# Patient Record
Sex: FEMALE | Race: WHITE | Employment: OTHER | URBAN - NONMETROPOLITAN AREA
[De-identification: names, ages, dates, MRNs, and addresses within clinical notes are randomized per-mention and may not be internally consistent; named-entity substitution may affect disease eponyms.]

---

## 2018-06-18 ENCOUNTER — OFFICE VISIT (OUTPATIENT)
Dept: FAMILY MEDICINE CLINIC | Age: 73
End: 2018-06-18
Payer: MEDICARE

## 2018-06-18 VITALS
BODY MASS INDEX: 27.58 KG/M2 | OXYGEN SATURATION: 93 % | WEIGHT: 171.6 LBS | RESPIRATION RATE: 16 BRPM | HEIGHT: 66 IN | DIASTOLIC BLOOD PRESSURE: 68 MMHG | HEART RATE: 69 BPM | SYSTOLIC BLOOD PRESSURE: 112 MMHG

## 2018-06-18 DIAGNOSIS — M54.50 ACUTE BILATERAL LOW BACK PAIN WITHOUT SCIATICA: ICD-10-CM

## 2018-06-18 DIAGNOSIS — Z91.81 AT HIGH RISK FOR FALLS: Primary | ICD-10-CM

## 2018-06-18 DIAGNOSIS — L72.3 SEBACEOUS CYST: ICD-10-CM

## 2018-06-18 DIAGNOSIS — R20.8 BURNING SENSATION OF TOE AND FOOT: ICD-10-CM

## 2018-06-18 LAB — HBA1C MFR BLD: 5.6 %

## 2018-06-18 PROCEDURE — G8419 CALC BMI OUT NRM PARAM NOF/U: HCPCS | Performed by: FAMILY MEDICINE

## 2018-06-18 PROCEDURE — 1123F ACP DISCUSS/DSCN MKR DOCD: CPT | Performed by: FAMILY MEDICINE

## 2018-06-18 PROCEDURE — 99204 OFFICE O/P NEW MOD 45 MIN: CPT | Performed by: FAMILY MEDICINE

## 2018-06-18 PROCEDURE — 3017F COLORECTAL CA SCREEN DOC REV: CPT | Performed by: FAMILY MEDICINE

## 2018-06-18 PROCEDURE — 1036F TOBACCO NON-USER: CPT | Performed by: FAMILY MEDICINE

## 2018-06-18 PROCEDURE — 83036 HEMOGLOBIN GLYCOSYLATED A1C: CPT | Performed by: FAMILY MEDICINE

## 2018-06-18 PROCEDURE — 4040F PNEUMOC VAC/ADMIN/RCVD: CPT | Performed by: FAMILY MEDICINE

## 2018-06-18 PROCEDURE — G8427 DOCREV CUR MEDS BY ELIG CLIN: HCPCS | Performed by: FAMILY MEDICINE

## 2018-06-18 PROCEDURE — G8400 PT W/DXA NO RESULTS DOC: HCPCS | Performed by: FAMILY MEDICINE

## 2018-06-18 PROCEDURE — 1090F PRES/ABSN URINE INCON ASSESS: CPT | Performed by: FAMILY MEDICINE

## 2018-06-18 PROCEDURE — 96372 THER/PROPH/DIAG INJ SC/IM: CPT | Performed by: FAMILY MEDICINE

## 2018-06-18 RX ORDER — NAPROXEN 500 MG/1
500 TABLET ORAL 2 TIMES DAILY WITH MEALS
COMMUNITY

## 2018-06-18 RX ORDER — PRAMIPEXOLE DIHYDROCHLORIDE 1 MG/1
1 TABLET ORAL 3 TIMES DAILY
COMMUNITY

## 2018-06-18 RX ORDER — KETOROLAC TROMETHAMINE 30 MG/ML
30 INJECTION, SOLUTION INTRAMUSCULAR; INTRAVENOUS ONCE
Status: COMPLETED | OUTPATIENT
Start: 2018-06-18 | End: 2018-06-18

## 2018-06-18 RX ORDER — CHLORTHALIDONE 25 MG/1
25 TABLET ORAL DAILY
COMMUNITY

## 2018-06-18 RX ORDER — ATORVASTATIN CALCIUM 40 MG/1
40 TABLET, FILM COATED ORAL DAILY
COMMUNITY

## 2018-06-18 RX ORDER — ATENOLOL 100 MG/1
100 TABLET ORAL DAILY
COMMUNITY

## 2018-06-18 RX ORDER — DESVENLAFAXINE 25 MG/1
25 TABLET, EXTENDED RELEASE ORAL DAILY
COMMUNITY

## 2018-06-18 RX ORDER — POTASSIUM CHLORIDE 750 MG/1
20 TABLET, FILM COATED, EXTENDED RELEASE ORAL DAILY
COMMUNITY

## 2018-06-18 RX ADMIN — KETOROLAC TROMETHAMINE 30 MG: 30 INJECTION, SOLUTION INTRAMUSCULAR; INTRAVENOUS at 16:00

## 2018-06-18 ASSESSMENT — PATIENT HEALTH QUESTIONNAIRE - PHQ9
SUM OF ALL RESPONSES TO PHQ QUESTIONS 1-9: 1
2. FEELING DOWN, DEPRESSED OR HOPELESS: 1
1. LITTLE INTEREST OR PLEASURE IN DOING THINGS: 0
SUM OF ALL RESPONSES TO PHQ9 QUESTIONS 1 & 2: 1

## 2018-06-18 ASSESSMENT — ENCOUNTER SYMPTOMS
BACK PAIN: 1
ABDOMINAL PAIN: 0
BOWEL INCONTINENCE: 0

## 2018-09-24 ENCOUNTER — HOSPITAL ENCOUNTER (OUTPATIENT)
Age: 73
Setting detail: THERAPIES SERIES
Discharge: HOME | End: 2018-09-24
Payer: MEDICARE

## 2018-09-24 PROCEDURE — G0008 ADMIN INFLUENZA VIRUS VAC: HCPCS

## 2018-09-24 PROCEDURE — 90686 IIV4 VACC NO PRSV 0.5 ML IM: CPT

## 2018-09-24 PROCEDURE — 6360000002 HC RX W HCPCS

## 2019-09-26 ENCOUNTER — HOSPITAL ENCOUNTER (EMERGENCY)
Facility: HOSPITAL | Age: 74
Discharge: HOME | End: 2019-09-26
Attending: EMERGENCY MEDICINE
Payer: MEDICARE

## 2019-09-26 VITALS
TEMPERATURE: 98.1 F | DIASTOLIC BLOOD PRESSURE: 68 MMHG | OXYGEN SATURATION: 99 % | RESPIRATION RATE: 18 BRPM | SYSTOLIC BLOOD PRESSURE: 117 MMHG | WEIGHT: 151.01 LBS | HEART RATE: 71 BPM

## 2019-09-26 DIAGNOSIS — M53.3 PAIN OF RIGHT SACROILIAC JOINT: Primary | ICD-10-CM

## 2019-09-26 PROCEDURE — 99283 EMERGENCY DEPT VISIT LOW MDM: CPT | Performed by: EMERGENCY MEDICINE

## 2019-09-26 RX ORDER — TRAMADOL HYDROCHLORIDE 50 MG/1
50 TABLET ORAL EVERY 8 HOURS PRN
Qty: 20 TABLET | Refills: 0 | Status: SHIPPED | OUTPATIENT
Start: 2019-09-26

## 2019-09-26 RX ORDER — ASPIRIN 81 MG/1
81 TABLET ORAL DAILY
COMMUNITY

## 2019-09-26 SDOH — HEALTH STABILITY: MENTAL HEALTH: HOW OFTEN DO YOU HAVE A DRINK CONTAINING ALCOHOL?: NEVER

## 2019-09-26 NOTE — ED PROVIDER NOTES
HPI:  Chief Complaint   Patient presents with   • Leg Pain     pt arrives with hip pain that shoots down her right leg. States they have been driving extended periods of time and has nerve pain. Can bear weight but hurts      HPI  Patient presents with reports of right hip and leg pain for the past 2 days.  Patient states she is traveling from [place] with her , states they have been driving the past 2 days.  Patient states for the past 2 weeks prior to going straight up, she was seen physical therapy for right SI joint pain.  Patient states she still has tenderness right SI joint, but now has pain radiating down right leg.  Patient states pain worse with movement and weightbearing on the right hip.  Patient denies injury or trauma.  Denies fever.  Patient has no erythema, warmth or swelling to right lower extremity.  Denies loss of bowel or bladder control.  Denies numbness, tingling or weakness to right lower extremity.  Denies saddle paresthesias.  Patient has no history of diabetes or IV drug use.  Patient denies chest pain or shortness of breath.  Denies hemoptysis.  States she takes an aspirin daily, and is not taking any other medication daily.    HISTORY:  Past Medical History:   Diagnosis Date   • Arthritis    • Hypertension    • Stroke (CMS/HCC) (HCC)        Past Surgical History:   Procedure Laterality Date   • HYSTERECTOMY     • JOINT REPLACEMENT         History reviewed. No pertinent family history.    Social History     Tobacco Use   • Smoking status: Never Smoker   • Smokeless tobacco: Never Used   Substance Use Topics   • Alcohol use: Never     Frequency: Never   • Drug use: Never       ROS:  Constitutional: negative for fever  HEENT: negative for sore throat  Respiratory: negative for cough, negative for shortness of breath, negative for hemoptysis  Cardiovascular: Negative for chest pain  GI: negative for vomiting, negative for loss of bowel control  : Negative for loss of bladder  control  Musculoskeletal: Positive for right SI joint tenderness, right hip and right leg tenderness, negative for edema  Neurological: Negative for saddle paresthesias, negative for numbness to lower extremities    PHYSICAL EXAM:  ED Triage Vitals [09/26/19 0939]   Temp Heart Rate Resp BP SpO2   36.7 °C (98.1 °F) 71 18 117/68 99 %      Temp Source Heart Rate Source Patient Position BP Location FiO2 (%)   Oral -- -- -- --     Nursing note and vitals reviewed.  Constitutional: appears well-developed.  Alert and interactive, well-hydrated, nontoxic-appearing.  Head: Normocephalic and atraumatic.   Eyes: Conjunctiva normal.  PERRLA, EOMI.  Neck: Supple  Cardiovascular: Regular rate and rhythm  Pulmonary/Chest: No respiratory distress.  Clear to auscultation bilaterally.  Abdominal: Soft and nontender.  Normal bowel sounds.  Back: No midline tenderness.  Musculoskeletal: No edema.  No calf tenderness.  No erythema, warmth or swelling to lower extremities.  Tenderness to palpation to the right SI joint, no tenderness to palpation to right hip and right lower extremity.  Strength equal to bilateral lower extremities, no lateralizing deficits.  Neurological: Alert. No focal deficits.  Sensation intact bilateral lower extremities.  DTRs 2+ to bilateral lower extremities.  Skin: Skin is warm and dry. No rash noted.   Psychiatric: Normal mood and affect.      Medications - No data to display    No orders to display       PROCEDURES:  Procedures    MDM: Patient presents with reports of right SI joint pain, states is having right SI joint pain for 2 weeks, that has worsened to the past 2 days.  Patient has been in the car driving the past 2 days with her spouse.  Patient denies any injury or trauma.  Patient has no signs or symptoms of cauda equina or epidural abscess.  Patient has no signs or symptoms of DVT.  Patient has tenderness to palpation of right SI joint, no tenderness to palpation right lower extremity.  Patient has  equal strength bilateral lower extremities without lateralizing deficits, no calf tenderness.  Discussed patient case with Dr. Pardo, attending ED physician examined patient as well and agrees no indication for imaging at this time, agrees with plan to treat patient with pain medication as needed.  Discussed recommendation to use pain medication as needed, recommend gentle stretching, discussed may use muscle rubs and creams as needed.  Patient inquired about SI joint injection, discussed SI joint injections not done the emergency department, and discussed recommendation for patient to follow-up with her primary care provider upon return home, patient agreeable with plan of care.     CLINICAL IMPRESSION:  Final diagnoses:   [M53.3] Pain of right sacroiliac joint        Radha Nolan, RIOS  09/26/19 1046

## 2019-09-26 NOTE — ED ATTESTATION NOTE
I performed a history and physical examination of Gisella Gomez and discussed her   management with Advanced Practice Provider, Radha Nolan CNP .  I agree with the history, physical, assessment, and plan of care.  On my face-to-face visit with patient, NAD.  She has SI joint tenderness and describes significant lumbar radiculopathy consistent with a sciatic nerve presentation.  No role for imaging.  No bowel or bladder incontinence    Patient without bowel or bladder incontinence, urinary retention, saddle anesthesia.  Normal neurological exam.  I do not suspect deep space infection, spinal impingement or acute cauda equina.      Will focus on symptom management and recommend follow-up with her primary care providers when she returns home        MD Lynda FRANCO MD  09/26/19 8081

## 2019-09-26 NOTE — DISCHARGE INSTRUCTIONS
Use pain medication as needed. Recommend gentle stretching, may use muscle rubs and creams.  Recommend follow-up with your primary care provider upon return home to arrange further recommendations or treatment including injections to the SI joint as needed.

## 2021-01-28 ENCOUNTER — IMMUNIZATION (OUTPATIENT)
Dept: PRIMARY CARE CLINIC | Age: 76
End: 2021-01-28
Payer: MEDICARE

## 2021-01-28 PROCEDURE — 0011A COVID-19, MODERNA VACCINE 100MCG/0.5ML DOSE: CPT | Performed by: FAMILY MEDICINE

## 2021-01-28 PROCEDURE — 91301 COVID-19, MODERNA VACCINE 100MCG/0.5ML DOSE: CPT | Performed by: FAMILY MEDICINE

## 2021-02-25 ENCOUNTER — IMMUNIZATION (OUTPATIENT)
Dept: PRIMARY CARE CLINIC | Age: 76
End: 2021-02-25
Payer: MEDICARE

## 2021-02-25 PROCEDURE — 0012A COVID-19, MODERNA VACCINE 100MCG/0.5ML DOSE: CPT | Performed by: FAMILY MEDICINE

## 2021-02-25 PROCEDURE — 91301 COVID-19, MODERNA VACCINE 100MCG/0.5ML DOSE: CPT | Performed by: FAMILY MEDICINE
